# Patient Record
Sex: MALE | Race: BLACK OR AFRICAN AMERICAN | NOT HISPANIC OR LATINO | Employment: FULL TIME | URBAN - METROPOLITAN AREA
[De-identification: names, ages, dates, MRNs, and addresses within clinical notes are randomized per-mention and may not be internally consistent; named-entity substitution may affect disease eponyms.]

---

## 2024-10-21 ENCOUNTER — HOSPITAL ENCOUNTER (EMERGENCY)
Facility: HOSPITAL | Age: 39
End: 2024-10-21
Attending: EMERGENCY MEDICINE
Payer: COMMERCIAL

## 2024-10-21 VITALS
DIASTOLIC BLOOD PRESSURE: 94 MMHG | TEMPERATURE: 98 F | WEIGHT: 236 LBS | HEART RATE: 89 BPM | HEIGHT: 72 IN | BODY MASS INDEX: 31.97 KG/M2 | OXYGEN SATURATION: 100 % | SYSTOLIC BLOOD PRESSURE: 167 MMHG | RESPIRATION RATE: 16 BRPM

## 2024-10-21 DIAGNOSIS — E11.10 DIABETIC KETOACIDOSIS WITHOUT COMA ASSOCIATED WITH TYPE 2 DIABETES MELLITUS: Primary | ICD-10-CM

## 2024-10-21 DIAGNOSIS — R73.9 HYPERGLYCEMIA: ICD-10-CM

## 2024-10-21 LAB
ALBUMIN SERPL BCP-MCNC: 4.8 G/DL (ref 3.5–5.2)
ALLENS TEST: ABNORMAL
ALP SERPL-CCNC: 103 U/L (ref 38–126)
ALT SERPL W/O P-5'-P-CCNC: 21 U/L (ref 10–44)
ANION GAP SERPL CALC-SCNC: 16 MMOL/L (ref 8–16)
ANION GAP SERPL CALC-SCNC: 21 MMOL/L (ref 8–16)
AST SERPL-CCNC: 26 U/L (ref 15–46)
B-OH-BUTYR BLD STRIP-SCNC: 4.9 MMOL/L (ref 0–0.5)
BACTERIA #/AREA URNS AUTO: ABNORMAL /HPF
BASOPHILS # BLD AUTO: 0.04 K/UL (ref 0–0.2)
BASOPHILS NFR BLD: 0.4 % (ref 0–1.9)
BILIRUB SERPL-MCNC: 0.5 MG/DL (ref 0.1–1)
BILIRUB UR QL STRIP: ABNORMAL
CALCIUM SERPL-MCNC: 8.4 MG/DL (ref 8.7–10.5)
CALCIUM SERPL-MCNC: 9.8 MG/DL (ref 8.7–10.5)
CHLORIDE SERPL-SCNC: 101 MMOL/L (ref 95–110)
CHLORIDE SERPL-SCNC: 97 MMOL/L (ref 95–110)
CLARITY UR REFRACT.AUTO: CLEAR
CO2 SERPL-SCNC: 14 MMOL/L (ref 23–29)
CO2 SERPL-SCNC: 17 MMOL/L (ref 23–29)
COLOR UR AUTO: YELLOW
CREAT SERPL-MCNC: 0.85 MG/DL (ref 0.5–1.4)
CREAT SERPL-MCNC: 0.86 MG/DL (ref 0.5–1.4)
DIFFERENTIAL METHOD BLD: ABNORMAL
EOSINOPHIL # BLD AUTO: 0.1 K/UL (ref 0–0.5)
EOSINOPHIL NFR BLD: 0.6 % (ref 0–8)
ERYTHROCYTE [DISTWIDTH] IN BLOOD BY AUTOMATED COUNT: 12.4 % (ref 11.5–14.5)
EST. GFR  (NO RACE VARIABLE): >60 ML/MIN/1.73 M^2
EST. GFR  (NO RACE VARIABLE): >60 ML/MIN/1.73 M^2
GLUCOSE SERPL-MCNC: 277 MG/DL (ref 70–110)
GLUCOSE SERPL-MCNC: 418 MG/DL (ref 70–110)
GLUCOSE UR QL STRIP: ABNORMAL
HCO3 UR-SCNC: 18.8 MMOL/L (ref 24–28)
HCT VFR BLD AUTO: 38.8 % (ref 40–54)
HGB BLD-MCNC: 13.6 G/DL (ref 14–18)
HGB UR QL STRIP: ABNORMAL
IMM GRANULOCYTES # BLD AUTO: 0.02 K/UL (ref 0–0.04)
IMM GRANULOCYTES NFR BLD AUTO: 0.2 % (ref 0–0.5)
KETONES UR QL STRIP: ABNORMAL
LEUKOCYTE ESTERASE UR QL STRIP: NEGATIVE
LYMPHOCYTES # BLD AUTO: 4.6 K/UL (ref 1–4.8)
LYMPHOCYTES NFR BLD: 42.4 % (ref 18–48)
MCH RBC QN AUTO: 30.9 PG (ref 27–31)
MCHC RBC AUTO-ENTMCNC: 35.1 G/DL (ref 32–36)
MCV RBC AUTO: 88 FL (ref 82–98)
MICROSCOPIC COMMENT: ABNORMAL
MONOCYTES # BLD AUTO: 1.1 K/UL (ref 0.3–1)
MONOCYTES NFR BLD: 10 % (ref 4–15)
NEUTROPHILS # BLD AUTO: 5 K/UL (ref 1.8–7.7)
NEUTROPHILS NFR BLD: 46.4 % (ref 38–73)
NITRITE UR QL STRIP: NEGATIVE
NRBC BLD-RTO: 0 /100 WBC
PCO2 BLDA: 36.3 MMHG (ref 35–45)
PH SMN: 7.32 [PH] (ref 7.35–7.45)
PH UR STRIP: 6 [PH] (ref 5–8)
PLATELET # BLD AUTO: 298 K/UL (ref 150–450)
PMV BLD AUTO: 12 FL (ref 9.2–12.9)
PO2 BLDA: 24 MMHG (ref 40–60)
POC BE: -7 MMOL/L
POC SATURATED O2: 40 % (ref 95–100)
POC TCO2: 20 MMOL/L (ref 24–29)
POCT GLUCOSE: 288 MG/DL (ref 70–110)
POTASSIUM SERPL-SCNC: 3.7 MMOL/L (ref 3.5–5.1)
POTASSIUM SERPL-SCNC: 3.7 MMOL/L (ref 3.5–5.1)
PROT SERPL-MCNC: 8.2 G/DL (ref 6–8.4)
PROT UR QL STRIP: NEGATIVE
RBC # BLD AUTO: 4.4 M/UL (ref 4.6–6.2)
RBC #/AREA URNS AUTO: 1 /HPF (ref 0–4)
SAMPLE: ABNORMAL
SITE: ABNORMAL
SODIUM SERPL-SCNC: 132 MMOL/L (ref 136–145)
SODIUM SERPL-SCNC: 134 MMOL/L (ref 136–145)
SP GR UR STRIP: >=1.03 (ref 1–1.03)
URN SPEC COLLECT METH UR: ABNORMAL
UROBILINOGEN UR STRIP-ACNC: NEGATIVE EU/DL
UUN UR-MCNC: 12 MG/DL (ref 2–20)
UUN UR-MCNC: 13 MG/DL (ref 2–20)
WBC # BLD AUTO: 10.75 K/UL (ref 3.9–12.7)
YEAST UR QL AUTO: ABNORMAL

## 2024-10-21 PROCEDURE — 99900035 HC TECH TIME PER 15 MIN (STAT): Mod: ER

## 2024-10-21 PROCEDURE — 96361 HYDRATE IV INFUSION ADD-ON: CPT | Mod: ER

## 2024-10-21 PROCEDURE — 85025 COMPLETE CBC W/AUTO DIFF WBC: CPT | Mod: ER | Performed by: PHYSICIAN ASSISTANT

## 2024-10-21 PROCEDURE — 99285 EMERGENCY DEPT VISIT HI MDM: CPT | Mod: 25,ER

## 2024-10-21 PROCEDURE — 80048 BASIC METABOLIC PNL TOTAL CA: CPT | Mod: 91,ER,XB | Performed by: PHYSICIAN ASSISTANT

## 2024-10-21 PROCEDURE — 81000 URINALYSIS NONAUTO W/SCOPE: CPT | Mod: ER | Performed by: PHYSICIAN ASSISTANT

## 2024-10-21 PROCEDURE — 82010 KETONE BODYS QUAN: CPT | Mod: ER | Performed by: PHYSICIAN ASSISTANT

## 2024-10-21 PROCEDURE — 80053 COMPREHEN METABOLIC PANEL: CPT | Mod: ER | Performed by: PHYSICIAN ASSISTANT

## 2024-10-21 PROCEDURE — 93010 ELECTROCARDIOGRAM REPORT: CPT | Mod: ,,, | Performed by: INTERNAL MEDICINE

## 2024-10-21 PROCEDURE — 82803 BLOOD GASES ANY COMBINATION: CPT | Mod: ER

## 2024-10-21 PROCEDURE — 82962 GLUCOSE BLOOD TEST: CPT | Mod: ER

## 2024-10-21 PROCEDURE — 93005 ELECTROCARDIOGRAM TRACING: CPT | Mod: ER

## 2024-10-21 PROCEDURE — 25000003 PHARM REV CODE 250: Mod: ER | Performed by: PHYSICIAN ASSISTANT

## 2024-10-21 PROCEDURE — 63600175 PHARM REV CODE 636 W HCPCS: Mod: ER | Performed by: PHYSICIAN ASSISTANT

## 2024-10-21 PROCEDURE — 96374 THER/PROPH/DIAG INJ IV PUSH: CPT | Mod: ER

## 2024-10-21 PROCEDURE — 25000003 PHARM REV CODE 250: Mod: ER | Performed by: EMERGENCY MEDICINE

## 2024-10-21 RX ORDER — POTASSIUM CHLORIDE 20 MEQ/1
40 TABLET, EXTENDED RELEASE ORAL
Status: COMPLETED | OUTPATIENT
Start: 2024-10-21 | End: 2024-10-21

## 2024-10-21 RX ADMIN — POTASSIUM CHLORIDE 40 MEQ: 1500 TABLET, EXTENDED RELEASE ORAL at 07:10

## 2024-10-21 RX ADMIN — SODIUM CHLORIDE 1000 ML: 9 INJECTION, SOLUTION INTRAVENOUS at 07:10

## 2024-10-21 RX ADMIN — HUMAN INSULIN 10 UNITS: 100 INJECTION, SOLUTION SUBCUTANEOUS at 08:10

## 2024-10-21 RX ADMIN — SODIUM CHLORIDE 1000 ML: 9 INJECTION, SOLUTION INTRAVENOUS at 08:10

## 2024-10-22 PROBLEM — E66.09 CLASS 1 OBESITY DUE TO EXCESS CALORIES WITHOUT SERIOUS COMORBIDITY WITH BODY MASS INDEX (BMI) OF 32.0 TO 32.9 IN ADULT: Status: ACTIVE | Noted: 2024-10-22

## 2024-10-22 PROBLEM — E11.10 DKA (DIABETIC KETOACIDOSIS): Status: ACTIVE | Noted: 2024-10-22

## 2024-10-22 PROBLEM — Z87.891 FORMER SMOKER, STOPPED SMOKING IN DISTANT PAST: Status: ACTIVE | Noted: 2024-10-22

## 2024-10-22 PROBLEM — D64.9 NORMOCYTIC ANEMIA: Status: ACTIVE | Noted: 2024-10-22

## 2024-10-22 PROBLEM — E11.9 DIABETES MELLITUS, NEW ONSET: Status: ACTIVE | Noted: 2024-10-22

## 2024-10-22 PROBLEM — E83.39 HYPOPHOSPHATEMIA: Status: ACTIVE | Noted: 2024-10-22

## 2024-10-22 PROBLEM — Z78.9 DAILY CONSUMPTION OF ALCOHOL: Status: ACTIVE | Noted: 2024-10-22

## 2024-10-22 PROBLEM — E66.811 CLASS 1 OBESITY DUE TO EXCESS CALORIES WITHOUT SERIOUS COMORBIDITY WITH BODY MASS INDEX (BMI) OF 32.0 TO 32.9 IN ADULT: Status: ACTIVE | Noted: 2024-10-22

## 2024-10-22 LAB
OHS QRS DURATION: 86 MS
OHS QTC CALCULATION: 427 MS

## 2024-10-23 PROBLEM — E11.10 DKA (DIABETIC KETOACIDOSIS): Status: RESOLVED | Noted: 2024-10-22 | Resolved: 2024-10-23

## 2024-10-23 PROBLEM — E83.39 HYPOPHOSPHATEMIA: Status: RESOLVED | Noted: 2024-10-22 | Resolved: 2024-10-23

## 2024-10-29 ENCOUNTER — OFFICE VISIT (OUTPATIENT)
Dept: FAMILY MEDICINE | Facility: CLINIC | Age: 39
End: 2024-10-29
Payer: COMMERCIAL

## 2024-10-29 ENCOUNTER — TELEPHONE (OUTPATIENT)
Dept: FAMILY MEDICINE | Facility: CLINIC | Age: 39
End: 2024-10-29
Payer: COMMERCIAL

## 2024-10-29 VITALS
SYSTOLIC BLOOD PRESSURE: 120 MMHG | HEIGHT: 72 IN | DIASTOLIC BLOOD PRESSURE: 70 MMHG | HEART RATE: 80 BPM | BODY MASS INDEX: 30.32 KG/M2 | RESPIRATION RATE: 99 BRPM | WEIGHT: 223.88 LBS

## 2024-10-29 DIAGNOSIS — E66.811 CLASS 1 OBESITY DUE TO EXCESS CALORIES WITHOUT SERIOUS COMORBIDITY WITH BODY MASS INDEX (BMI) OF 32.0 TO 32.9 IN ADULT: ICD-10-CM

## 2024-10-29 DIAGNOSIS — D64.9 NORMOCYTIC ANEMIA: ICD-10-CM

## 2024-10-29 DIAGNOSIS — E11.9 DIABETES MELLITUS, NEW ONSET: Primary | ICD-10-CM

## 2024-10-29 DIAGNOSIS — E66.09 CLASS 1 OBESITY DUE TO EXCESS CALORIES WITHOUT SERIOUS COMORBIDITY WITH BODY MASS INDEX (BMI) OF 32.0 TO 32.9 IN ADULT: ICD-10-CM

## 2024-10-29 DIAGNOSIS — Z23 NEED FOR VACCINATION: ICD-10-CM

## 2024-10-29 DIAGNOSIS — E78.2 MIXED HYPERLIPIDEMIA: ICD-10-CM

## 2024-10-29 DIAGNOSIS — Z87.891 FORMER SMOKER, STOPPED SMOKING IN DISTANT PAST: ICD-10-CM

## 2024-10-29 PROCEDURE — 90677 PCV20 VACCINE IM: CPT | Mod: S$GLB,,, | Performed by: STUDENT IN AN ORGANIZED HEALTH CARE EDUCATION/TRAINING PROGRAM

## 2024-10-29 PROCEDURE — 3078F DIAST BP <80 MM HG: CPT | Mod: CPTII,S$GLB,, | Performed by: STUDENT IN AN ORGANIZED HEALTH CARE EDUCATION/TRAINING PROGRAM

## 2024-10-29 PROCEDURE — 3008F BODY MASS INDEX DOCD: CPT | Mod: CPTII,S$GLB,, | Performed by: STUDENT IN AN ORGANIZED HEALTH CARE EDUCATION/TRAINING PROGRAM

## 2024-10-29 PROCEDURE — 1160F RVW MEDS BY RX/DR IN RCRD: CPT | Mod: CPTII,S$GLB,, | Performed by: STUDENT IN AN ORGANIZED HEALTH CARE EDUCATION/TRAINING PROGRAM

## 2024-10-29 PROCEDURE — 90715 TDAP VACCINE 7 YRS/> IM: CPT | Mod: S$GLB,,, | Performed by: STUDENT IN AN ORGANIZED HEALTH CARE EDUCATION/TRAINING PROGRAM

## 2024-10-29 PROCEDURE — 3066F NEPHROPATHY DOC TX: CPT | Mod: CPTII,S$GLB,, | Performed by: STUDENT IN AN ORGANIZED HEALTH CARE EDUCATION/TRAINING PROGRAM

## 2024-10-29 PROCEDURE — 3061F NEG MICROALBUMINURIA REV: CPT | Mod: CPTII,S$GLB,, | Performed by: STUDENT IN AN ORGANIZED HEALTH CARE EDUCATION/TRAINING PROGRAM

## 2024-10-29 PROCEDURE — 90471 IMMUNIZATION ADMIN: CPT | Mod: S$GLB,,, | Performed by: STUDENT IN AN ORGANIZED HEALTH CARE EDUCATION/TRAINING PROGRAM

## 2024-10-29 PROCEDURE — 3046F HEMOGLOBIN A1C LEVEL >9.0%: CPT | Mod: CPTII,S$GLB,, | Performed by: STUDENT IN AN ORGANIZED HEALTH CARE EDUCATION/TRAINING PROGRAM

## 2024-10-29 PROCEDURE — 90472 IMMUNIZATION ADMIN EACH ADD: CPT | Mod: S$GLB,,, | Performed by: STUDENT IN AN ORGANIZED HEALTH CARE EDUCATION/TRAINING PROGRAM

## 2024-10-29 PROCEDURE — 1159F MED LIST DOCD IN RCRD: CPT | Mod: CPTII,S$GLB,, | Performed by: STUDENT IN AN ORGANIZED HEALTH CARE EDUCATION/TRAINING PROGRAM

## 2024-10-29 PROCEDURE — 3074F SYST BP LT 130 MM HG: CPT | Mod: CPTII,S$GLB,, | Performed by: STUDENT IN AN ORGANIZED HEALTH CARE EDUCATION/TRAINING PROGRAM

## 2024-10-29 PROCEDURE — 90656 IIV3 VACC NO PRSV 0.5 ML IM: CPT | Mod: S$GLB,,, | Performed by: STUDENT IN AN ORGANIZED HEALTH CARE EDUCATION/TRAINING PROGRAM

## 2024-10-29 PROCEDURE — 99999 PR PBB SHADOW E&M-EST. PATIENT-LVL V: CPT | Mod: PBBFAC,,, | Performed by: STUDENT IN AN ORGANIZED HEALTH CARE EDUCATION/TRAINING PROGRAM

## 2024-10-29 PROCEDURE — 99204 OFFICE O/P NEW MOD 45 MIN: CPT | Mod: 25,S$GLB,, | Performed by: STUDENT IN AN ORGANIZED HEALTH CARE EDUCATION/TRAINING PROGRAM

## 2024-10-29 RX ORDER — BLOOD-GLUCOSE CONTROL, NORMAL
1 EACH MISCELLANEOUS 2 TIMES DAILY
Qty: 100 EACH | Refills: 2 | Status: SHIPPED | OUTPATIENT
Start: 2024-10-29 | End: 2025-01-27

## 2024-10-29 RX ORDER — METFORMIN HYDROCHLORIDE 500 MG/1
500 TABLET, EXTENDED RELEASE ORAL
Qty: 90 TABLET | Refills: 3 | Status: SHIPPED | OUTPATIENT
Start: 2024-10-29 | End: 2025-10-29

## 2024-10-30 ENCOUNTER — PATIENT MESSAGE (OUTPATIENT)
Dept: FAMILY MEDICINE | Facility: CLINIC | Age: 39
End: 2024-10-30
Payer: COMMERCIAL

## 2024-10-30 RX ORDER — PEN NEEDLE, DIABETIC 33 GX5/32"
1 NEEDLE, DISPOSABLE MISCELLANEOUS NIGHTLY
Qty: 100 EACH | Refills: 3 | Status: SHIPPED | OUTPATIENT
Start: 2024-10-30 | End: 2025-01-28

## 2024-10-30 NOTE — TELEPHONE ENCOUNTER
Pt would like the needles that go to the lantus pen. It is not on list so I cannot pend. Please advise.

## 2024-10-31 ENCOUNTER — PATIENT MESSAGE (OUTPATIENT)
Dept: FAMILY MEDICINE | Facility: CLINIC | Age: 39
End: 2024-10-31
Payer: COMMERCIAL

## 2024-10-31 RX ORDER — ATORVASTATIN CALCIUM 20 MG/1
20 TABLET, FILM COATED ORAL DAILY
Qty: 90 TABLET | Refills: 3 | Status: SHIPPED | OUTPATIENT
Start: 2024-10-31 | End: 2025-10-31

## 2024-11-05 ENCOUNTER — PATIENT MESSAGE (OUTPATIENT)
Dept: RESEARCH | Facility: HOSPITAL | Age: 39
End: 2024-11-05
Payer: COMMERCIAL

## 2024-11-05 ENCOUNTER — OFFICE VISIT (OUTPATIENT)
Dept: FAMILY MEDICINE | Facility: CLINIC | Age: 39
End: 2024-11-05
Payer: COMMERCIAL

## 2024-11-05 DIAGNOSIS — E78.2 MIXED HYPERLIPIDEMIA: ICD-10-CM

## 2024-11-05 DIAGNOSIS — E11.9 DIABETES MELLITUS, NEW ONSET: Primary | ICD-10-CM

## 2024-11-05 PROCEDURE — 3061F NEG MICROALBUMINURIA REV: CPT | Mod: CPTII,95,, | Performed by: STUDENT IN AN ORGANIZED HEALTH CARE EDUCATION/TRAINING PROGRAM

## 2024-11-05 PROCEDURE — 1160F RVW MEDS BY RX/DR IN RCRD: CPT | Mod: CPTII,95,, | Performed by: STUDENT IN AN ORGANIZED HEALTH CARE EDUCATION/TRAINING PROGRAM

## 2024-11-05 PROCEDURE — 99214 OFFICE O/P EST MOD 30 MIN: CPT | Mod: 95,,, | Performed by: STUDENT IN AN ORGANIZED HEALTH CARE EDUCATION/TRAINING PROGRAM

## 2024-11-05 PROCEDURE — 3046F HEMOGLOBIN A1C LEVEL >9.0%: CPT | Mod: CPTII,95,, | Performed by: STUDENT IN AN ORGANIZED HEALTH CARE EDUCATION/TRAINING PROGRAM

## 2024-11-05 PROCEDURE — 1159F MED LIST DOCD IN RCRD: CPT | Mod: CPTII,95,, | Performed by: STUDENT IN AN ORGANIZED HEALTH CARE EDUCATION/TRAINING PROGRAM

## 2024-11-05 PROCEDURE — 3066F NEPHROPATHY DOC TX: CPT | Mod: CPTII,95,, | Performed by: STUDENT IN AN ORGANIZED HEALTH CARE EDUCATION/TRAINING PROGRAM

## 2024-11-12 ENCOUNTER — PATIENT MESSAGE (OUTPATIENT)
Dept: ADMINISTRATIVE | Facility: HOSPITAL | Age: 39
End: 2024-11-12
Payer: COMMERCIAL

## 2024-11-13 ENCOUNTER — OFFICE VISIT (OUTPATIENT)
Dept: OPTOMETRY | Facility: CLINIC | Age: 39
End: 2024-11-13
Payer: COMMERCIAL

## 2024-11-13 ENCOUNTER — PATIENT MESSAGE (OUTPATIENT)
Dept: RESEARCH | Facility: HOSPITAL | Age: 39
End: 2024-11-13
Payer: COMMERCIAL

## 2024-11-13 DIAGNOSIS — H52.4 PRESBYOPIA: ICD-10-CM

## 2024-11-13 DIAGNOSIS — E11.9 DIABETES MELLITUS, NEW ONSET: Primary | ICD-10-CM

## 2024-11-13 PROCEDURE — 1159F MED LIST DOCD IN RCRD: CPT | Mod: CPTII,S$GLB,, | Performed by: OPTOMETRIST

## 2024-11-13 PROCEDURE — 3066F NEPHROPATHY DOC TX: CPT | Mod: CPTII,S$GLB,, | Performed by: OPTOMETRIST

## 2024-11-13 PROCEDURE — 3046F HEMOGLOBIN A1C LEVEL >9.0%: CPT | Mod: CPTII,S$GLB,, | Performed by: OPTOMETRIST

## 2024-11-13 PROCEDURE — 99999 PR PBB SHADOW E&M-EST. PATIENT-LVL III: CPT | Mod: PBBFAC,,, | Performed by: OPTOMETRIST

## 2024-11-13 PROCEDURE — 3061F NEG MICROALBUMINURIA REV: CPT | Mod: CPTII,S$GLB,, | Performed by: OPTOMETRIST

## 2024-11-13 PROCEDURE — 92004 COMPRE OPH EXAM NEW PT 1/>: CPT | Mod: S$GLB,,, | Performed by: OPTOMETRIST

## 2024-11-13 PROCEDURE — 2023F DILAT RTA XM W/O RTNOPTHY: CPT | Mod: CPTII,S$GLB,, | Performed by: OPTOMETRIST

## 2024-11-13 NOTE — PROGRESS NOTES
HPI    CC: 39 yo M presents today to establish diabetic eye care. Pt reports   noticeable near vision changes, mainly when looking at cell phone. Pt   denies eye pain, floaters and flashes. Pt reports BS is much better   controlled now. He states levels have been 80 - 115 for the last few   weeks.     EVA: not sure    (-) Changes in vision   (-) Pain  (-) Irritation   (-) Itching   (-) Flashes  (-) Floaters  (-) Glasses wearer  (-) CL wearer  (-) Uses eye gtts    Does patient want a refraction today? If needed    (-) Eye injury  (-) Eye surgery   (-)POHx  (-)FOHx    (+)DM  Hemoglobin A1C       Date                     Value               Ref Range             Status                10/21/2024               12.7 (H)            4.0 - 5.6 %           Final                     Last edited by Sahra Villela, OD on 11/13/2024 12:49 PM.            Assessment /Plan     For exam results, see Encounter Report.    Diabetes mellitus, new onset  -     Ambulatory referral/consult to Optometry    Presbyopia      No retinopathy noted, OU. Continue proper BS control and annual diabetic eye exams. Monitor yearly.      2. Recommend use of OTC reading glasses prn for extended near work. Monitor yearly.        RTC in 1 year for annual eye exam or sooner if needed.

## 2024-11-13 NOTE — PROGRESS NOTES
Patient ID: Glenn Dwyer is a 38 y.o. male.    Chief Complaint: No chief complaint on file.    History of Present Illness    CHIEF COMPLAINT:  Glenn presents today for follow-up on diabetes management and medication adjustments.    DIABETES MANAGEMENT:  He reports his blood sugar range has been between 87 and 93 during the day, and around 98 before dinner. His lowest blood glucose level was 87 on November 2nd, which was the first time it had been in the 80s. He denies feeling weak or experiencing symptoms of low blood sugar.    MEDICATIONS:  He reports no side effects or adverse reactions from the new medication regimen.    DIET:  He has implemented dietary changes to lower cholesterol. He has incorporated whole grains (wheat bread and Cheerios), more vegetables (asparagus, broccoli, and salads), and fish (salmon and tuna) into his diet. He is actively seeking information about appropriate food choices and inquiring about the least fatty options.      ROS:  General: -fever, -chills, -fatigue, -weight gain, -weight loss  Eyes: -vision changes, -redness, -discharge  ENT: -ear pain, -nasal congestion, -sore throat  Cardiovascular: -chest pain, -palpitations, -lower extremity edema  Respiratory: -cough, -shortness of breath  Gastrointestinal: -abdominal pain, -nausea, -vomiting, -diarrhea, -constipation, -blood in stool  Genitourinary: -dysuria, -hematuria, -frequency  Musculoskeletal: -joint pain, -muscle pain  Skin: -rash, -lesion  Neurological: -headache, -dizziness, -numbness, -tingling, -weakness  Psychiatric: -anxiety, -depression, -sleep difficulty           General: No acute distress. Well-developed. Well-nourished.  Eyes: EOMI. Sclerae anicteric.  HENT: Normocephalic. Atraumatic. Nares patent. Moist oral mucosa.  Ears: Bilateral TMs clear. Bilateral EACs clear.  Cardiovascular: Regular rate. Regular rhythm. No murmurs. No rubs. No gallops. Normal S1, S2.  Respiratory: Normal respiratory effort. Clear to  auscultation bilaterally. No rales. No rhonchi. No wheezing.  Abdomen: Soft. Non-tender. Non-distended. Normoactive bowel sounds.  Musculoskeletal: No  obvious deformity.  Extremities: No lower extremity edema.  Neurological: Alert & oriented x3. No slurred speech. Normal gait.  Psychiatric: Normal mood. Normal affect. Good insight. Good judgment.  Skin: Warm. Dry. No rash.         Assessment & Plan    TYPE 2 DIABETES:  - Continued insulin at current dosage (27 units) as glucose levels have improved and are generally within target range.  - Advised reducing insulin by 3 units if glucose drops below 80 mg/dL due to dietary changes.  - Explained the increased risk of heart disease associated with type 2 diabetes and the protective effects of statins.  - Continued metformin ER at current dose.  - Continued insulin at 27 units, with instruction to decrease by 3 units if glucose drops below 80 mg/dL.  - Send a message if blood sugar drop significantly.    HYPERLIPIDEMIA:  - Initiated atorvastatin for patient with type 2 diabetes and elevated LDL cholesterol to reduce cardiovascular risk.  - Started atorvastatin for cholesterol management.    DIETARY MANAGEMENT:  - Discussed healthy food choices for managing cholesterol and blood sugar, including whole grains, vegetables, lean meats, and fish.  - Glenn to continue eating whole grain bread instead of white bread.  - Glenn to maintain consumption of vegetables like asparagus, broccoli, and salads.  - Recommend choosing lean meats like chicken and less fatty cuts of red meat.  - Glenn to include fish such as salmon and tuna in diet.    FOLLOW UP:  - Contact the office if experiencing any side effects from the new medication (atorvastatin).       1. Diabetes mellitus, new onset    2. Mixed hyperlipidemia          The patient location is: [place]  The chief complaint leading to consultation is: Hyperlipidemia + DM type 2    Visit type: audiovisual    Face to Face time with  patient: 20  30 minutes of total time spent on the encounter, which includes face to face time and non-face to face time preparing to see the patient (eg, review of tests), Obtaining and/or reviewing separately obtained history, Documenting clinical information in the electronic or other health record, Independently interpreting results (not separately reported) and communicating results to the patient/family/caregiver, or Care coordination (not separately reported).         Each patient to whom he or she provides medical services by telemedicine is:  (1) informed of the relationship between the physician and patient and the respective role of any other health care provider with respect to management of the patient; and (2) notified that he or she may decline to receive medical services by telemedicine and may withdraw from such care at any time.    Notes:      No follow-ups on file.    This note was generated with the assistance of ambient listening technology. Verbal consent was obtained by the patient and accompanying visitor(s) for the recording of patient appointment to facilitate this note. I attest to having reviewed and edited the generated note for accuracy, though some syntax or spelling errors may persist. Please contact the author of this note for any clarification.    Answers submitted by the patient for this visit:  Diabetes Questionnaire (Submitted on 11/1/2024)  Chief Complaint: Diabetes problem  Diabetes type: type 1  MedicAlert ID: No  Disease duration: 1 Weeks  blurred vision: Yes  visual change: Yes  weight loss: Yes  Symptom course: stable  confusion: No  speech difficulty: No  dizziness: No  nervous/anxious: No  headaches: No  hunger: No  mood changes: No  pallor: No  seizures: No  tremors: No  sleepiness: No  sweats: No  blackouts: No  hospitalization: No  nocturnal hypoglycemia: No  required assistance: No  required glucagon: No  CVA: No  heart disease: No  impotence: No  nephropathy:  No  peripheral neuropathy: No  PVD: No  retinopathy: No  autonomic neuropathy: No  CAD risks: dyslipidemia, family history, diabetes mellitus  Current treatments: diet, insulin injections, oral agent (monotherapy)  Treatment compliance: all of the time  Dose schedule: at bedtime  Given by: patient  Injection sites: abdominal wall  Home blood tests: 1-2 x per day  Home urines: <1 x per month  Monitoring compliance: excellent  Blood glucose trend: decreasing steadily  Weight trend: stable  Current diet: diabetic  Meal planning: carbohydrate counting  Exercise: rarely  Dietitian visit: No  Eye exam current: No  Sees podiatrist: No     Statement Selected

## 2024-12-02 ENCOUNTER — OFFICE VISIT (OUTPATIENT)
Dept: FAMILY MEDICINE | Facility: CLINIC | Age: 39
End: 2024-12-02
Payer: COMMERCIAL

## 2024-12-02 VITALS
SYSTOLIC BLOOD PRESSURE: 112 MMHG | BODY MASS INDEX: 30.68 KG/M2 | HEART RATE: 93 BPM | HEIGHT: 72 IN | DIASTOLIC BLOOD PRESSURE: 78 MMHG | WEIGHT: 226.5 LBS | OXYGEN SATURATION: 97 %

## 2024-12-02 DIAGNOSIS — E11.9 DIABETES MELLITUS, NEW ONSET: Primary | ICD-10-CM

## 2024-12-02 DIAGNOSIS — Z79.4 INSULIN DEPENDENT TYPE 2 DIABETES MELLITUS: ICD-10-CM

## 2024-12-02 DIAGNOSIS — E11.9 INSULIN DEPENDENT TYPE 2 DIABETES MELLITUS: ICD-10-CM

## 2024-12-02 DIAGNOSIS — E78.2 MIXED HYPERLIPIDEMIA: ICD-10-CM

## 2024-12-02 PROCEDURE — 3066F NEPHROPATHY DOC TX: CPT | Mod: CPTII,S$GLB,,

## 2024-12-02 PROCEDURE — 3074F SYST BP LT 130 MM HG: CPT | Mod: CPTII,S$GLB,,

## 2024-12-02 PROCEDURE — 1159F MED LIST DOCD IN RCRD: CPT | Mod: CPTII,S$GLB,,

## 2024-12-02 PROCEDURE — 99999 PR PBB SHADOW E&M-EST. PATIENT-LVL IV: CPT | Mod: PBBFAC,,,

## 2024-12-02 PROCEDURE — 3008F BODY MASS INDEX DOCD: CPT | Mod: CPTII,S$GLB,,

## 2024-12-02 PROCEDURE — 3078F DIAST BP <80 MM HG: CPT | Mod: CPTII,S$GLB,,

## 2024-12-02 PROCEDURE — 3046F HEMOGLOBIN A1C LEVEL >9.0%: CPT | Mod: CPTII,S$GLB,,

## 2024-12-02 PROCEDURE — 99214 OFFICE O/P EST MOD 30 MIN: CPT | Mod: S$GLB,,,

## 2024-12-02 PROCEDURE — 3061F NEG MICROALBUMINURIA REV: CPT | Mod: CPTII,S$GLB,,

## 2024-12-02 PROCEDURE — 1160F RVW MEDS BY RX/DR IN RCRD: CPT | Mod: CPTII,S$GLB,,

## 2024-12-02 RX ORDER — BLOOD-GLUCOSE SENSOR
1 EACH MISCELLANEOUS DAILY
Qty: 5 EACH | Refills: 5 | Status: SHIPPED | OUTPATIENT
Start: 2024-12-02 | End: 2025-12-02

## 2024-12-02 NOTE — PROGRESS NOTES
Subjective:      Glenn Dwyer is a 39 y.o. male, patient of Shea Perez MD with a PMH listed below.       History of Present Illness    CHIEF COMPLAINT:  Glenn presents today for a one-month follow-up for diabetes management.    DIABETES MANAGEMENT:  He was diagnosed with diabetes in the ER on 10/21/2024, A1C 12.5. Currently taking Metformin  mg daily and Lantus 27 units nightly. Voices GI symptoms resolved with switching to Metformin ER. Blood sugar levels have been stable, with fasting levels ranging from 74 to 120, typically around 83 to 85 in the mornings and high 90s at night. He continues to make dietary changes and voices feeling much better overall now that his sugars are controlled. Denies any current complaints.          Review of Systems   Constitutional:  Negative for chills and fever.   Eyes:  Negative for visual disturbance.   Respiratory:  Negative for cough, chest tightness, shortness of breath and wheezing.    Cardiovascular:  Negative for chest pain and palpitations.   Gastrointestinal:  Negative for abdominal pain, constipation, diarrhea, nausea and vomiting.   Genitourinary:  Negative for dysuria, frequency, hematuria and urgency.   Musculoskeletal:  Negative for arthralgias and myalgias.   Skin:  Negative for rash.   Neurological:  Negative for dizziness and headaches.          Objective:     Vitals:    12/02/24 0941   BP: 112/78   BP Location: Left arm   Patient Position: Sitting   Pulse: 93   SpO2: 97%   Weight: 102.7 kg (226 lb 8.4 oz)   Height: 6' (1.829 m)          Physical Exam  Vitals reviewed.   Constitutional:       General: He is awake. He is not in acute distress.     Appearance: He is obese. He is not ill-appearing.   HENT:      Mouth/Throat:      Mouth: Mucous membranes are moist.   Eyes:      General:         Right eye: No discharge.         Left eye: No discharge.      Pupils: Pupils are equal, round, and reactive to light.   Cardiovascular:      Rate and Rhythm:  Normal rate and regular rhythm.   Pulmonary:      Effort: Pulmonary effort is normal. No respiratory distress.      Breath sounds: Normal breath sounds.   Abdominal:      General: Bowel sounds are normal. There is no distension.      Palpations: Abdomen is soft.      Tenderness: There is no abdominal tenderness.   Musculoskeletal:         General: Normal range of motion.   Skin:     General: Skin is warm and dry.   Neurological:      Mental Status: He is alert and oriented to person, place, and time.   Psychiatric:         Mood and Affect: Mood normal.         Behavior: Behavior normal. Behavior is cooperative.             Assessment:         ICD-10-CM ICD-9-CM   1. Diabetes mellitus, new onset  E11.9 250.00   2. Insulin dependent type 2 diabetes mellitus  E11.9 250.00    Z79.4 V58.67   3. Mixed hyperlipidemia  E78.2 272.2       Plan:       Diabetes mellitus, new onset  Insulin dependent type 2 diabetes mellitus  -     blood-glucose sensor (DEXCOM G7 SENSOR) Jenny; 1 Device by Misc.(Non-Drug; Combo Route) route Daily.  Dispense: 5 each; Refill: 5  -     blood-glucose meter,continuous Misc; 1 Device by Misc.(Non-Drug; Combo Route) route Daily.  Dispense: 1 each; Refill: 0  -Assessed patient's diabetes management: glucose levels have improved significantly since starting treatment, with fasting levels now ranging from  mg/dL. This am fasting glucose 90.   - Explained target A1C level of less than 7.   - Glenn to continue current diet and lifestyle modifications.  - Continue Metformin  mg daily.  - Continue Lantus insulin 27 units at night.  - Decrease Lantus by 3 units (to 24 units) if glucose is consistently less than 80 mg/dL or if experiencing symptoms of hypoglycemia.  - Ordered Dexcom continuous glucose monitoring system (pending insurance approval). Patient voices fingers tips sore from frequency of accu checks.   - Complete hemoglobin A1C lab on 1/22/2025 as scheduled.  - Follow up with Dr. Perez  as scheduled on 1/29/2025 as scheduled.     Mixed hyperlipidemia  - Continue Fenofibrate 160 mg QD  - Continue Atorvastatin 20 mg QD.  - Complete lipid panel lab on 1/22/2025 as scheduled.  - Follow up with Dr. Perez as scheduled on 1/29/2025 as scheduled.     Declined foot exam and covid vaccine at visit today.          RTC/ED precautions discussed where applicable.   Encouraged patient/caregiver to let me know if there are any further questions/concerns.   Patient/caretaker agrees with the treatment plan.     No follow-ups on file.      TRENTON Borrego  Family Medicine  [facility]     This note was generated with the assistance of ambient listening technology. Verbal consent was obtained by the patient and accompanying visitor(s) for the recording of patient appointment to facilitate this note. I attest to having reviewed and edited the generated note for accuracy, though some syntax or spelling errors may persist. Please contact the author of this note for any clarification.

## 2024-12-19 ENCOUNTER — PATIENT MESSAGE (OUTPATIENT)
Dept: FAMILY MEDICINE | Facility: CLINIC | Age: 39
End: 2024-12-19
Payer: COMMERCIAL

## 2025-01-15 ENCOUNTER — PATIENT MESSAGE (OUTPATIENT)
Dept: FAMILY MEDICINE | Facility: CLINIC | Age: 40
End: 2025-01-15
Payer: COMMERCIAL

## 2025-01-15 DIAGNOSIS — E11.9 DIABETES MELLITUS, NEW ONSET: ICD-10-CM

## 2025-01-15 DIAGNOSIS — E11.9 INSULIN DEPENDENT TYPE 2 DIABETES MELLITUS: ICD-10-CM

## 2025-01-15 DIAGNOSIS — Z79.4 INSULIN DEPENDENT TYPE 2 DIABETES MELLITUS: ICD-10-CM

## 2025-01-15 RX ORDER — ASPIRIN 81 MG/1
81 TABLET ORAL DAILY
Qty: 30 TABLET | Refills: 2 | Status: SHIPPED | OUTPATIENT
Start: 2025-01-15 | End: 2025-04-15

## 2025-01-15 RX ORDER — FENOFIBRATE 160 MG/1
160 TABLET ORAL DAILY
Qty: 30 TABLET | Refills: 2 | Status: SHIPPED | OUTPATIENT
Start: 2025-01-15 | End: 2025-04-15

## 2025-01-15 RX ORDER — BLOOD-GLUCOSE SENSOR
1 EACH MISCELLANEOUS DAILY
Qty: 5 EACH | Refills: 5 | Status: SHIPPED | OUTPATIENT
Start: 2025-01-15 | End: 2026-01-15

## 2025-01-18 ENCOUNTER — PATIENT MESSAGE (OUTPATIENT)
Dept: FAMILY MEDICINE | Facility: CLINIC | Age: 40
End: 2025-01-18
Payer: COMMERCIAL

## 2025-01-24 NOTE — TELEPHONE ENCOUNTER
I called the pharmacy to see what was going on bc this pt has refills and Denise even sent a message in regards to it and he read it. The pharmacy told me that the medicine does have refills and they are working on it. I called pt and informed him and he understood verbally.

## 2025-01-27 ENCOUNTER — PATIENT MESSAGE (OUTPATIENT)
Dept: FAMILY MEDICINE | Facility: CLINIC | Age: 40
End: 2025-01-27
Payer: COMMERCIAL

## 2025-02-03 ENCOUNTER — OFFICE VISIT (OUTPATIENT)
Dept: FAMILY MEDICINE | Facility: CLINIC | Age: 40
End: 2025-02-03
Payer: COMMERCIAL

## 2025-02-03 VITALS
WEIGHT: 233.38 LBS | OXYGEN SATURATION: 98 % | SYSTOLIC BLOOD PRESSURE: 120 MMHG | HEART RATE: 82 BPM | BODY MASS INDEX: 31.61 KG/M2 | DIASTOLIC BLOOD PRESSURE: 80 MMHG | HEIGHT: 72 IN

## 2025-02-03 DIAGNOSIS — Z79.4 INSULIN DEPENDENT TYPE 2 DIABETES MELLITUS: ICD-10-CM

## 2025-02-03 DIAGNOSIS — E78.2 MIXED HYPERLIPIDEMIA: Primary | ICD-10-CM

## 2025-02-03 DIAGNOSIS — E11.9 INSULIN DEPENDENT TYPE 2 DIABETES MELLITUS: ICD-10-CM

## 2025-02-03 PROCEDURE — 3072F LOW RISK FOR RETINOPATHY: CPT | Mod: CPTII,S$GLB,, | Performed by: STUDENT IN AN ORGANIZED HEALTH CARE EDUCATION/TRAINING PROGRAM

## 2025-02-03 PROCEDURE — 3074F SYST BP LT 130 MM HG: CPT | Mod: CPTII,S$GLB,, | Performed by: STUDENT IN AN ORGANIZED HEALTH CARE EDUCATION/TRAINING PROGRAM

## 2025-02-03 PROCEDURE — 3079F DIAST BP 80-89 MM HG: CPT | Mod: CPTII,S$GLB,, | Performed by: STUDENT IN AN ORGANIZED HEALTH CARE EDUCATION/TRAINING PROGRAM

## 2025-02-03 PROCEDURE — 1160F RVW MEDS BY RX/DR IN RCRD: CPT | Mod: CPTII,S$GLB,, | Performed by: STUDENT IN AN ORGANIZED HEALTH CARE EDUCATION/TRAINING PROGRAM

## 2025-02-03 PROCEDURE — 3008F BODY MASS INDEX DOCD: CPT | Mod: CPTII,S$GLB,, | Performed by: STUDENT IN AN ORGANIZED HEALTH CARE EDUCATION/TRAINING PROGRAM

## 2025-02-03 PROCEDURE — 99214 OFFICE O/P EST MOD 30 MIN: CPT | Mod: S$GLB,,, | Performed by: STUDENT IN AN ORGANIZED HEALTH CARE EDUCATION/TRAINING PROGRAM

## 2025-02-03 PROCEDURE — 1159F MED LIST DOCD IN RCRD: CPT | Mod: CPTII,S$GLB,, | Performed by: STUDENT IN AN ORGANIZED HEALTH CARE EDUCATION/TRAINING PROGRAM

## 2025-02-03 PROCEDURE — 3044F HG A1C LEVEL LT 7.0%: CPT | Mod: CPTII,S$GLB,, | Performed by: STUDENT IN AN ORGANIZED HEALTH CARE EDUCATION/TRAINING PROGRAM

## 2025-02-03 PROCEDURE — 99999 PR PBB SHADOW E&M-EST. PATIENT-LVL V: CPT | Mod: PBBFAC,,, | Performed by: STUDENT IN AN ORGANIZED HEALTH CARE EDUCATION/TRAINING PROGRAM

## 2025-02-03 RX ORDER — METFORMIN HYDROCHLORIDE 500 MG/1
1000 TABLET, EXTENDED RELEASE ORAL 2 TIMES DAILY WITH MEALS
Qty: 360 TABLET | Refills: 3 | Status: SHIPPED | OUTPATIENT
Start: 2025-02-03 | End: 2026-02-03

## 2025-02-03 NOTE — PROGRESS NOTES
Patient ID: Glenn Dwyer is a 39 y.o. male.    Chief Complaint: Follow-up    History of Present Illness    CHIEF COMPLAINT:  Glenn presents today for diabetes follow up.    DIABETES:  He uses Dexcom continuous glucose monitor and reports morning finger stick blood sugar was 83. He denies symptoms of hypoglycemia. He continues insulin 27 units at night and Metformin 500mg extended release daily without side effects.    MEDICATIONS:  He continues cholesterol medication and aspirin.      ROS:  General: -fever, -chills, -fatigue, -weight gain, -weight loss  Eyes: -vision changes, -redness, -discharge  ENT: -ear pain, -nasal congestion, -sore throat  Cardiovascular: -chest pain, -palpitations, -lower extremity edema  Respiratory: -cough, -shortness of breath  Gastrointestinal: -abdominal pain, -nausea, -vomiting, -diarrhea, -constipation, -blood in stool  Genitourinary: -dysuria, -hematuria, -frequency  Musculoskeletal: -joint pain, -muscle pain  Skin: -rash, -lesion  Neurological: -headache, -dizziness, -numbness, -tingling, -weakness  Psychiatric: -anxiety, -depression, -sleep difficulty         Physical Exam    General: No acute distress. Well-developed. Well-nourished.  Eyes: EOMI. Sclerae anicteric.  HENT: Normocephalic. Atraumatic. Nares patent. Moist oral mucosa.  Ears: Bilateral TMs clear. Bilateral EACs clear.  Cardiovascular: Regular rate. Regular rhythm. No murmurs. No rubs. No gallops. Normal S1, S2.  Respiratory: Normal respiratory effort. Clear to auscultation bilaterally. No rales. No rhonchi. No wheezing.  Abdomen: Soft. Non-tender. Non-distended. Normoactive bowel sounds.  Musculoskeletal: No  obvious deformity.  Extremities: No lower extremity edema.  Neurological: Alert & oriented x3. No slurred speech. Normal gait.  Psychiatric: Normal mood. Normal affect. Good insight. Good judgment.  Skin: Warm. Dry. No rash.         Assessment & Plan    DIABETES:  - Assessed glycemic control; A1C has improved  significantly.  - Evaluated current insulin regimen; patient is using 27 units of insulin at night.  - Aimed to maintain morning glucose between  mg/dL.  - Instructed the patient to monitor morning glucose daily and log readings.  - Educated the patient on symptoms of hypoglycemia (shaky, sweaty, finger stick <80 or 70 mg/dL).  - Advised the patient to carry a quick source of sugar for hypoglycemia management.  - Explained the 15-minute discrepancy between Dexcom and finger stick readings.  - Informed the patient that finger stick is more accurate than Dexcom readings.  - Considered strategy to potentially wean patient off insulin.  - Will gradually reduce insulin dose while increasing metformin.  - Decreased insulin from 27 units to 24 units nightly.  - Instructed the patient to adjust insulin dose based on morning glucose readings: Decrease insulin by 2 units if glucose consistently below 90 mg/dL; Increase insulin by 2 units if glucose consistently above 140-150 mg/dL.  - Planned to increase metformin to 2000 mg daily over 3 weeks.  - May consider adding GLP-1 agonist or other oral agents in future.  - Ordered A1C test for 3 months.  - Referred the patient to diabetes educator for meal planning and insulin management.  - Noted patient uses a Dexcom continuous glucose monitor.  - Addressed patient's reports of occasional discrepancies between Dexcom readings and finger stick measurements.  - Continued use of Dexcom for glucose monitoring, with periodic finger stick checks for calibration.  - Noted patient reports maintaining a good diet.  - Recommend diabetes education for meal planning and insulin management.  - Referred patient to diabetes educator for dietary counseling and insulin management.  - Planned to maximize metformin dosage to 2000mg daily over 3 weeks.  - Increased metformin (extended release) gradually: Week 1: 500mg in morning, 500mg at night; Week 2: 1000mg in morning, 500mg at night; Week 3:  1000mg in morning, 1000mg at night.    HYPERLIPIDEMIA:  - Continued current cholesterol medication.  - Ordered cholesterol panel.    CARDIOVASCULAR HEALTH:  - Continued current aspirin regimen.    FOLLOW UP:  - Scheduled follow up in 3 weeks to reassess glucose control and medication regimen.  - Contact office if running out of medications or need refills.       1. Mixed hyperlipidemia    2. Insulin dependent type 2 diabetes mellitus  Assessment & Plan:  Assessment:  Type 2 diabetes with significant improvement (HbA1c from 12.7% to 6.2% in 3 months).  On Metformin  mg daily and 27 units nightly insulin.  Fasting glucose 83 mg/dL, suggesting possible over-insulinization.  Plan:  Increase Metformin XR by 500 mg weekly to 2000 mg daily as tolerated.  Reduce insulin to 24 units nightly, with further reductions of 2-4 units every 1-2 weeks based on fasting glucose.  Monitor fasting glucose daily; aim to discontinue insulin if stable.  Follow up in 3 weeks to review glucose logs; recheck HbA1c in 3 months.  Reinforce dietary changes and watch for hypoglycemia symptoms.      Protective Sensation (w/ 10 gram monofilament):  Right: Intact  Left: Intact    Visual Inspection:  Normal -  Bilateral    Pedal Pulses:   Right: Present  Left: Present    Posterior Tibialis Pulses:   Right:Present  Left: Present     Orders:  -     Hemoglobin A1C; Future; Expected date: 05/03/2025  -     Ambulatory referral/consult to Diabetes Education; Future; Expected date: 02/10/2025    Other orders  -     metFORMIN (GLUCOPHAGE-XR) 500 MG ER 24hr tablet; Take 2 tablets (1,000 mg total) by mouth 2 (two) times daily with meals. Increase by 500 mg every week until you reach goal of 2000 mg daily (2 in the morning, 2 at night).  Dispense: 360 tablet; Refill: 3             No follow-ups on file.    This note was generated with the assistance of ambient listening technology. Verbal consent was obtained by the patient and accompanying visitor(s) for  the recording of patient appointment to facilitate this note. I attest to having reviewed and edited the generated note for accuracy, though some syntax or spelling errors may persist. Please contact the author of this note for any clarification.

## 2025-02-03 NOTE — ASSESSMENT & PLAN NOTE
Assessment:  Type 2 diabetes with significant improvement (HbA1c from 12.7% to 6.2% in 3 months).  On Metformin  mg daily and 27 units nightly insulin.  Fasting glucose 83 mg/dL, suggesting possible over-insulinization.  Plan:  Increase Metformin XR by 500 mg weekly to 2000 mg daily as tolerated.  Reduce insulin to 24 units nightly, with further reductions of 2-4 units every 1-2 weeks based on fasting glucose.  Monitor fasting glucose daily; aim to discontinue insulin if stable.  Follow up in 3 weeks to review glucose logs; recheck HbA1c in 3 months.  Reinforce dietary changes and watch for hypoglycemia symptoms.      Protective Sensation (w/ 10 gram monofilament):  Right: Intact  Left: Intact    Visual Inspection:  Normal -  Bilateral    Pedal Pulses:   Right: Present  Left: Present    Posterior Tibialis Pulses:   Right:Present  Left: Present

## 2025-02-17 RX ORDER — FENOFIBRATE 160 MG/1
160 TABLET ORAL DAILY
Qty: 30 TABLET | Refills: 2 | Status: SHIPPED | OUTPATIENT
Start: 2025-02-17 | End: 2025-05-18

## 2025-02-24 ENCOUNTER — OFFICE VISIT (OUTPATIENT)
Dept: FAMILY MEDICINE | Facility: CLINIC | Age: 40
End: 2025-02-24
Payer: COMMERCIAL

## 2025-02-24 VITALS
HEIGHT: 72 IN | SYSTOLIC BLOOD PRESSURE: 120 MMHG | WEIGHT: 229.06 LBS | BODY MASS INDEX: 31.03 KG/M2 | HEART RATE: 77 BPM | OXYGEN SATURATION: 98 % | DIASTOLIC BLOOD PRESSURE: 80 MMHG

## 2025-02-24 DIAGNOSIS — E11.9 INSULIN DEPENDENT TYPE 2 DIABETES MELLITUS: Primary | ICD-10-CM

## 2025-02-24 DIAGNOSIS — Z79.4 INSULIN DEPENDENT TYPE 2 DIABETES MELLITUS: Primary | ICD-10-CM

## 2025-02-24 PROCEDURE — 3044F HG A1C LEVEL LT 7.0%: CPT | Mod: CPTII,S$GLB,, | Performed by: STUDENT IN AN ORGANIZED HEALTH CARE EDUCATION/TRAINING PROGRAM

## 2025-02-24 PROCEDURE — G2211 COMPLEX E/M VISIT ADD ON: HCPCS | Mod: S$GLB,,, | Performed by: STUDENT IN AN ORGANIZED HEALTH CARE EDUCATION/TRAINING PROGRAM

## 2025-02-24 PROCEDURE — 3074F SYST BP LT 130 MM HG: CPT | Mod: CPTII,S$GLB,, | Performed by: STUDENT IN AN ORGANIZED HEALTH CARE EDUCATION/TRAINING PROGRAM

## 2025-02-24 PROCEDURE — 3079F DIAST BP 80-89 MM HG: CPT | Mod: CPTII,S$GLB,, | Performed by: STUDENT IN AN ORGANIZED HEALTH CARE EDUCATION/TRAINING PROGRAM

## 2025-02-24 PROCEDURE — 99214 OFFICE O/P EST MOD 30 MIN: CPT | Mod: S$GLB,,, | Performed by: STUDENT IN AN ORGANIZED HEALTH CARE EDUCATION/TRAINING PROGRAM

## 2025-02-24 PROCEDURE — 99999 PR PBB SHADOW E&M-EST. PATIENT-LVL IV: CPT | Mod: PBBFAC,,, | Performed by: STUDENT IN AN ORGANIZED HEALTH CARE EDUCATION/TRAINING PROGRAM

## 2025-02-24 PROCEDURE — 3072F LOW RISK FOR RETINOPATHY: CPT | Mod: CPTII,S$GLB,, | Performed by: STUDENT IN AN ORGANIZED HEALTH CARE EDUCATION/TRAINING PROGRAM

## 2025-02-24 PROCEDURE — 1159F MED LIST DOCD IN RCRD: CPT | Mod: CPTII,S$GLB,, | Performed by: STUDENT IN AN ORGANIZED HEALTH CARE EDUCATION/TRAINING PROGRAM

## 2025-02-24 PROCEDURE — 1160F RVW MEDS BY RX/DR IN RCRD: CPT | Mod: CPTII,S$GLB,, | Performed by: STUDENT IN AN ORGANIZED HEALTH CARE EDUCATION/TRAINING PROGRAM

## 2025-02-24 PROCEDURE — 3008F BODY MASS INDEX DOCD: CPT | Mod: CPTII,S$GLB,, | Performed by: STUDENT IN AN ORGANIZED HEALTH CARE EDUCATION/TRAINING PROGRAM

## 2025-02-24 NOTE — PROGRESS NOTES
Patient ID: Glenn Dwyer is a 39 y.o. male.    Chief Complaint: Follow-up (3 wk f/u)    History of Present Illness    CHIEF COMPLAINT:  Glenn presents today for diabetes follow up.    DIABETES MANAGEMENT:  He reports blood sugar readings ranging from . He is currently taking insulin 22 units and metformin, which he tolerates without side effects. He has obtained insulin pens from pharmacy.      ROS:  General: -fever, -chills, -fatigue, -weight gain, -weight loss  Eyes: -vision changes, -redness, -discharge  ENT: -ear pain, -nasal congestion, -sore throat  Cardiovascular: -chest pain, -palpitations, -lower extremity edema  Respiratory: -cough, -shortness of breath  Gastrointestinal: -abdominal pain, -nausea, -vomiting, -diarrhea, -constipation, -blood in stool  Genitourinary: -dysuria, -hematuria, -frequency  Musculoskeletal: -joint pain, -muscle pain  Skin: -rash, -lesion  Neurological: -headache, -dizziness, -numbness, -tingling  Psychiatric: -anxiety, -depression, -sleep difficulty         Physical Exam    General: No acute distress. Well-developed. Well-nourished.  Eyes: EOMI. Sclerae anicteric.  HENT: Normocephalic. Atraumatic. Nares patent. Moist oral mucosa.  Ears: Bilateral TMs clear. Bilateral EACs clear.  Cardiovascular: Regular rate. Regular rhythm. No murmurs. No rubs. No gallops. Normal S1, S2.  Respiratory: Normal respiratory effort. Clear to auscultation bilaterally. No rales. No rhonchi. No wheezing.  Abdomen: Soft. Non-tender. Non-distended. Normoactive bowel sounds.  Musculoskeletal: No  obvious deformity.  Extremities: No lower extremity edema.  Neurological: Alert & oriented x3. No slurred speech. Normal gait.  Psychiatric: Normal mood. Normal affect. Good insight. Good judgment.  Skin: Warm. Dry. No rash.         Assessment & Plan    DIABETES:  - Monitor the patient's blood sugar, which have recently improved with the current regimen.  - Evaluate recent blood sugar readings, which include  109, 81, 97, 99, 82, and 71, indicating levels within the target range, possibly lower than 90.  - Assess the need to adjust insulin dosage based on blood sugar.  - Aim to maintain glycemic control while gradually reducing insulin dose from 22 units to 19 units, and potentially to 16 units if blood sugar remains in target range.  - Consider addition of another agent if insulin cannot be discontinued with current approach.  - Prescribe metformin to improve insulin sensitivity.  - Advise the patient to contact the office if blood sugar start rising above target range during insulin reduction.  - Monitor the patient's current insulin usage, with recent dosage of 22 units.  - Evaluate that patient's blood sugar are well-controlled on current insulin regimen.  - Assess the possibility of reducing and eventually discontinuing insulin use.  - Aim to taper patient off insulin while maintaining glycemic control.  - Decrease insulin from 22 units to 19 units.  - Instruct the patient to continue decreasing insulin by 2-3 units every 2-3 days if blood sugar remains below 90.  - Advise the patient to maintain current insulin dose if blood sugar rises above target range.  - Plan to reassess insulin use in a month and consider adding another agent to help discontinue insulin if needed.  - Monitor the patient's current use of metformin.  - Evaluate that metformin is helping to control blood sugar without side effects.  - Assess the effectiveness of metformin in managing the patient's diabetes.  - Continue metformin 2000 mg daily.  - Educate the patient that metformin increases insulin sensitivity, which helps improve blood sugar control.  - Consider adding another oral agent if needed to optimize diabetes management.    FOLLOW UP:  - Schedule follow-up visit in 1 month, either in person or virtually, based on patient preference.  - Instruct the patient to upload blood sugar readings prior to virtual appointment if chosen.  -  Schedule follow up in a month to reassess the effectiveness of the current treatment regimen.       1. Insulin dependent type 2 diabetes mellitus  Assessment & Plan:  Assessment:  Type 2 diabetes with significant improvement (HbA1c from 12.7% to 6.2% in 3 months).  On Metformin XR 2000 mg daily and 22 units nightly insulin.  Fasting glucose 83 mg/dL, suggesting possible over-insulinization.  Plan:  Continue with Metformin XR 2000 mg daily -- well-tolerated.  Reduce insulin to 19 units nightly, with further reductions of 2-4 units every 1-2 weeks based on fasting glucose.  Monitor fasting glucose daily; aim to discontinue insulin if stable.  Follow up in 4 weeks to review glucose logs; recheck HbA1c in 2 months.  Reinforce dietary changes and watch for hypoglycemia symptoms.                No follow-ups on file.    This note was generated with the assistance of ambient listening technology. Verbal consent was obtained by the patient and accompanying visitor(s) for the recording of patient appointment to facilitate this note. I attest to having reviewed and edited the generated note for accuracy, though some syntax or spelling errors may persist. Please contact the author of this note for any clarification.

## 2025-02-24 NOTE — ASSESSMENT & PLAN NOTE
Assessment:  Type 2 diabetes with significant improvement (HbA1c from 12.7% to 6.2% in 3 months).  On Metformin XR 2000 mg daily and 22 units nightly insulin.  Fasting glucose 83 mg/dL, suggesting possible over-insulinization.  Plan:  Continue with Metformin XR 2000 mg daily -- well-tolerated.  Reduce insulin to 19 units nightly, with further reductions of 2-4 units every 1-2 weeks based on fasting glucose.  Monitor fasting glucose daily; aim to discontinue insulin if stable.  Follow up in 4 weeks to review glucose logs; recheck HbA1c in 2 months.  Reinforce dietary changes and watch for hypoglycemia symptoms.

## 2025-03-06 ENCOUNTER — PATIENT MESSAGE (OUTPATIENT)
Dept: DIABETES | Facility: CLINIC | Age: 40
End: 2025-03-06
Payer: COMMERCIAL

## 2025-03-21 ENCOUNTER — PATIENT MESSAGE (OUTPATIENT)
Dept: FAMILY MEDICINE | Facility: CLINIC | Age: 40
End: 2025-03-21
Payer: COMMERCIAL

## 2025-03-24 ENCOUNTER — OFFICE VISIT (OUTPATIENT)
Dept: FAMILY MEDICINE | Facility: CLINIC | Age: 40
End: 2025-03-24
Payer: COMMERCIAL

## 2025-03-24 ENCOUNTER — PATIENT MESSAGE (OUTPATIENT)
Dept: FAMILY MEDICINE | Facility: CLINIC | Age: 40
End: 2025-03-24

## 2025-03-24 DIAGNOSIS — E11.9 INSULIN DEPENDENT TYPE 2 DIABETES MELLITUS: Primary | ICD-10-CM

## 2025-03-24 DIAGNOSIS — Z79.4 INSULIN DEPENDENT TYPE 2 DIABETES MELLITUS: Primary | ICD-10-CM

## 2025-03-24 PROCEDURE — G2211 COMPLEX E/M VISIT ADD ON: HCPCS | Mod: 95,,, | Performed by: STUDENT IN AN ORGANIZED HEALTH CARE EDUCATION/TRAINING PROGRAM

## 2025-03-24 PROCEDURE — 3072F LOW RISK FOR RETINOPATHY: CPT | Mod: CPTII,95,, | Performed by: STUDENT IN AN ORGANIZED HEALTH CARE EDUCATION/TRAINING PROGRAM

## 2025-03-24 PROCEDURE — 3044F HG A1C LEVEL LT 7.0%: CPT | Mod: CPTII,95,, | Performed by: STUDENT IN AN ORGANIZED HEALTH CARE EDUCATION/TRAINING PROGRAM

## 2025-03-24 PROCEDURE — 1159F MED LIST DOCD IN RCRD: CPT | Mod: CPTII,95,, | Performed by: STUDENT IN AN ORGANIZED HEALTH CARE EDUCATION/TRAINING PROGRAM

## 2025-03-24 PROCEDURE — 98006 SYNCH AUDIO-VIDEO EST MOD 30: CPT | Mod: 95,,, | Performed by: STUDENT IN AN ORGANIZED HEALTH CARE EDUCATION/TRAINING PROGRAM

## 2025-03-24 PROCEDURE — 1160F RVW MEDS BY RX/DR IN RCRD: CPT | Mod: CPTII,95,, | Performed by: STUDENT IN AN ORGANIZED HEALTH CARE EDUCATION/TRAINING PROGRAM

## 2025-03-25 NOTE — PROGRESS NOTES
Patient ID: Glenn Dwyer is a 39 y.o. male.    Chief Complaint: No chief complaint on file.    History of Present Illness    CHIEF COMPLAINT:  Glenn presents today for diabetes follow up    DIABETES MANAGEMENT:  He reports morning fasting blood sugar of 113 with reduced insulin dose of 3 units the previous night. He continues Metformin 2000mg and insulin therapy.      ROS:  General: -fever, -chills, -fatigue, -weight gain, -weight loss  Eyes: -vision changes, -redness, -discharge  ENT: -ear pain, -nasal congestion, -sore throat  Cardiovascular: -chest pain, -palpitations, -lower extremity edema  Respiratory: -cough, -shortness of breath  Gastrointestinal: -abdominal pain, -nausea, -vomiting, -diarrhea, -constipation, -blood in stool  Genitourinary: -dysuria, -hematuria, -frequency  Musculoskeletal: -joint pain, -muscle pain  Skin: -rash, -lesion  Neurological: -headache, -dizziness, -numbness, -tingling  Psychiatric: -anxiety, -depression, -sleep difficulty         Physical Exam    General: No acute distress. Well-developed. Well-nourished.  Eyes: EOMI. Sclerae anicteric.  HENT: Normocephalic. Atraumatic. Nares patent. Moist oral mucosa.  Ears: Bilateral TMs clear. Bilateral EACs clear.  Cardiovascular: Regular rate. Regular rhythm. No murmurs. No rubs. No gallops. Normal S1, S2.  Respiratory: Normal respiratory effort. Clear to auscultation bilaterally. No rales. No rhonchi. No wheezing.  Abdomen: Soft. Non-tender. Non-distended. Normoactive bowel sounds.  Musculoskeletal: No  obvious deformity.  Extremities: No lower extremity edema.  Neurological: Alert & oriented x3. No slurred speech. Normal gait.  Psychiatric: Normal mood. Normal affect. Good insight. Good judgment.  Skin: Warm. Dry. No rash.         Assessment & Plan    TYPE 2 DIABETES MELLITUS:  - Reviewed the patient's blood sugar recordings, noting morning pre-breakfast levels are satisfactory at 113 mg/dL.  - Evaluated that the blood sugar control is  adequate.  - Discontinued insulin therapy due to well-controlled blood sugar.  - Advised to contact the office if blood sugar consistently exceed 130 mg/dL after discontinuing insulin.  - Ordered A1C test to be repeated in 2 months to reassess glycemic control after insulin discontinuation.  - If blood sugar exceed 130 mg/dL after insulin discontinuation, will consider alternative medication options.    LONG-TERM USE OF ORAL HYPOGLYCEMIC DRUGS:  - Continued metformin 2000 mg daily.    FOLLOW-UP:  - Scheduled follow-up visit in 2 months for A1C reassessment.       1. Insulin dependent type 2 diabetes mellitus          The patient location is: [place]  The chief complaint leading to consultation is: Diabetes    Visit type: audiovisual    Face to Face time with patient: 20  30 minutes of total time spent on the encounter, which includes face to face time and non-face to face time preparing to see the patient (eg, review of tests), Obtaining and/or reviewing separately obtained history, Documenting clinical information in the electronic or other health record, Independently interpreting results (not separately reported) and communicating results to the patient/family/caregiver, or Care coordination (not separately reported).         Each patient to whom he or she provides medical services by telemedicine is:  (1) informed of the relationship between the physician and patient and the respective role of any other health care provider with respect to management of the patient; and (2) notified that he or she may decline to receive medical services by telemedicine and may withdraw from such care at any time.    Notes:      No follow-ups on file.    This note was generated with the assistance of ambient listening technology. Verbal consent was obtained by the patient and accompanying visitor(s) for the recording of patient appointment to facilitate this note. I attest to having reviewed and edited the generated note for  accuracy, though some syntax or spelling errors may persist. Please contact the author of this note for any clarification.    Answers submitted by the patient for this visit:  Diabetes Questionnaire (Submitted on 3/23/2025)  Chief Complaint: Diabetes problem  Diabetes type: type 2  MedicAlert ID: No  Disease duration: 5 Months  blurred vision: No  chest pain: No  fatigue: Yes  foot paresthesias: No  foot ulcerations: No  polydipsia: No  polyphagia: No  polyuria: No  visual change: No  weakness: No  weight loss: No  Symptom course: resolved  confusion: No  speech difficulty: No  dizziness: No  nervous/anxious: No  headaches: No  hunger: No  mood changes: No  pallor: No  seizures: No  tremors: No  sleepiness: Yes  sweats: No  blackouts: No  hospitalization: No  nocturnal hypoglycemia: No  required assistance: No  required glucagon: No  CVA: No  heart disease: No  impotence: No  nephropathy: No  peripheral neuropathy: No  PVD: No  retinopathy: No  autonomic neuropathy: No  CAD risks: no known risk factors  Current treatments: diet, insulin injections, oral agent (dual therapy)  Treatment compliance: most of the time  Dose schedule: at bedtime  Given by: patient  Injection sites: abdominal wall  Home blood tests: 1-2 x per day  Home urines: 1-2 x per month  Monitoring compliance: good  Blood glucose trend: fluctuating dramatically  Weight trend: fluctuating minimally  Current diet: diabetic  Meal planning: carbohydrate counting  Exercise: rarely  Dietitian visit: No  Eye exam current: Yes  Sees podiatrist: No

## 2025-03-31 ENCOUNTER — PATIENT MESSAGE (OUTPATIENT)
Dept: FAMILY MEDICINE | Facility: CLINIC | Age: 40
End: 2025-03-31
Payer: COMMERCIAL

## 2025-04-23 ENCOUNTER — OFFICE VISIT (OUTPATIENT)
Dept: FAMILY MEDICINE | Facility: CLINIC | Age: 40
End: 2025-04-23
Payer: COMMERCIAL

## 2025-04-23 DIAGNOSIS — E11.9 TYPE 2 DIABETES MELLITUS WITHOUT COMPLICATION, WITHOUT LONG-TERM CURRENT USE OF INSULIN: Primary | ICD-10-CM

## 2025-04-23 PROCEDURE — 3072F LOW RISK FOR RETINOPATHY: CPT | Mod: CPTII,95,, | Performed by: STUDENT IN AN ORGANIZED HEALTH CARE EDUCATION/TRAINING PROGRAM

## 2025-04-23 PROCEDURE — 1159F MED LIST DOCD IN RCRD: CPT | Mod: CPTII,95,, | Performed by: STUDENT IN AN ORGANIZED HEALTH CARE EDUCATION/TRAINING PROGRAM

## 2025-04-23 PROCEDURE — 98006 SYNCH AUDIO-VIDEO EST MOD 30: CPT | Mod: 95,,, | Performed by: STUDENT IN AN ORGANIZED HEALTH CARE EDUCATION/TRAINING PROGRAM

## 2025-04-23 PROCEDURE — G2211 COMPLEX E/M VISIT ADD ON: HCPCS | Mod: 95,,, | Performed by: STUDENT IN AN ORGANIZED HEALTH CARE EDUCATION/TRAINING PROGRAM

## 2025-04-23 PROCEDURE — 3044F HG A1C LEVEL LT 7.0%: CPT | Mod: CPTII,95,, | Performed by: STUDENT IN AN ORGANIZED HEALTH CARE EDUCATION/TRAINING PROGRAM

## 2025-04-23 PROCEDURE — 1160F RVW MEDS BY RX/DR IN RCRD: CPT | Mod: CPTII,95,, | Performed by: STUDENT IN AN ORGANIZED HEALTH CARE EDUCATION/TRAINING PROGRAM

## 2025-04-23 RX ORDER — SEMAGLUTIDE 1.34 MG/ML
1 INJECTION, SOLUTION SUBCUTANEOUS
Qty: 3 ML | Refills: 11 | Status: SHIPPED | OUTPATIENT
Start: 2025-06-23 | End: 2026-06-23

## 2025-04-23 RX ORDER — SEMAGLUTIDE 0.68 MG/ML
0.25 INJECTION, SOLUTION SUBCUTANEOUS
Qty: 1.5 ML | Refills: 0 | Status: SHIPPED | OUTPATIENT
Start: 2025-04-23 | End: 2025-05-23

## 2025-04-23 RX ORDER — SEMAGLUTIDE 0.68 MG/ML
0.5 INJECTION, SOLUTION SUBCUTANEOUS
Qty: 3 ML | Refills: 0 | Status: SHIPPED | OUTPATIENT
Start: 2025-05-23 | End: 2025-06-22

## 2025-04-23 NOTE — PROGRESS NOTES
Patient ID: Glenn Dwyer is a 39 y.o. male.    Chief Complaint: No chief complaint on file.    History of Present Illness    CHIEF COMPLAINT:  Glenn presents today for diabetes follow up.    DIABETES:  He is currently managed with Metformin 2000mg twice daily and is no longer on insulin. He reports glucose levels tend to run higher at night, with a recent episode reaching 200 after consuming a salad with dressing. He uses Dexcom for glucose monitoring.    CURRENT MEDICATIONS:  He continues atorvastatin, fenofibrate, aspirin, and metformin.      ROS:  General: -fever, -chills, -fatigue, -weight gain, -weight loss  Eyes: -vision changes, -redness, -discharge  ENT: -ear pain, -nasal congestion, -sore throat  Cardiovascular: -chest pain, -palpitations, -lower extremity edema  Respiratory: -cough, -shortness of breath  Gastrointestinal: -abdominal pain, -nausea, -vomiting, -diarrhea, -constipation, -blood in stool, +loss of appetite, +appetite changes  Genitourinary: -dysuria, -hematuria, -frequency  Musculoskeletal: -joint pain, -muscle pain  Skin: -rash, -lesion  Neurological: -headache, -dizziness, -numbness, -tingling  Psychiatric: -anxiety, -depression, -sleep difficulty  Endocrine: +lightheaded if meals are missed, +irritable if meals are missed                 Assessment & Plan    TYPE 2 DIABETES MELLITUS WITH HYPERGLYCEMIA:  - Assessed glucose control after discontinuing insulin.  - Noted concern about higher glucose levels, particularly at night.  - Monitored the patient's blood sugar using a Dexcom continuous glucose monitor.  - Observed variable blood sugar, with some high readings, especially at night.  - Noted a specific instance of high blood sugar (200) after the patient consumed a salad.  - Started Ozempic (semaglutide) injectable: Initial dose 0.25 mg once weekly for 1 month, increase to 0.5 mg once weekly after 1 month, increase to 1 mg once weekly after 2 months.  - Explained once-weekly administration  and gradual dose increase plan.  - Continued Metformin 1000 mg twice daily (2000 mg total daily dose).  - Ordered HbA1c test in 1 month to reassess treatment efficacy.  - Instructed the patient to continue monitoring blood sugar and report any significant changes.  - Maintained metformin therapy at 1000 mg twice daily (2000 mg total daily dose) for diabetes management.  - Educated on potential impact of salad dressings on glucose levels.  - Recommend being mindful of restaurant salad dressings as they may contain high amounts of sugar.  - Recommend making homemade dressings using lemon and olive oil as a healthier alternative.    HYPERLIPIDEMIA:  - Continued atorvastatin and fenofibrate for hyperlipidemia management.    PRESENCE OF FUNCTIONAL IMPLANT (CONTINUOUS GLUCOSE MONITOR):  - Noted the presence of a Dexcom continuous glucose monitor implant for monitoring blood sugar.  - Instructed the patient on proper care and maintenance of the implant.  - Advised to report any issues or malfunctions with the device immediately.       1. Type 2 diabetes mellitus without complication, without long-term current use of insulin  -     semaglutide (OZEMPIC) 0.25 mg or 0.5 mg (2 mg/3 mL) pen injector; Inject 0.25 mg into the skin every 7 days.  Dispense: 1.5 mL; Refill: 0  -     semaglutide (OZEMPIC) 0.25 mg or 0.5 mg (2 mg/3 mL) pen injector; Inject 0.5 mg into the skin every 7 days.  Dispense: 3 mL; Refill: 0  -     semaglutide (OZEMPIC) 1 mg/dose (4 mg/3 mL); Inject 1 mg into the skin every 7 days.  Dispense: 3 mL; Refill: 11          The patient location is: [facility]  The chief complaint leading to consultation is: DM    Visit type: audiovisual    Face to Face time with patient: 20  30 minutes of total time spent on the encounter, which includes face to face time and non-face to face time preparing to see the patient (eg, review of tests), Obtaining and/or reviewing separately obtained history, Documenting clinical  information in the electronic or other health record, Independently interpreting results (not separately reported) and communicating results to the patient/family/caregiver, or Care coordination (not separately reported).         Each patient to whom he or she provides medical services by telemedicine is:  (1) informed of the relationship between the physician and patient and the respective role of any other health care provider with respect to management of the patient; and (2) notified that he or she may decline to receive medical services by telemedicine and may withdraw from such care at any time.    Notes:      No follow-ups on file.    This note was generated with the assistance of ambient listening technology. Verbal consent was obtained by the patient and accompanying visitor(s) for the recording of patient appointment to facilitate this note. I attest to having reviewed and edited the generated note for accuracy, though some syntax or spelling errors may persist. Please contact the author of this note for any clarification.    Answers submitted by the patient for this visit:  Diabetes Questionnaire (Submitted on 4/23/2025)  Chief Complaint: Diabetes problem  Diabetes type: type 2  MedicAlert ID: No  Disease duration: 6 Months  blurred vision: No  chest pain: No  fatigue: No  foot paresthesias: No  foot ulcerations: No  polydipsia: No  polyphagia: No  polyuria: No  visual change: No  weakness: No  weight loss: No  Symptom course: resolved  confusion: No  speech difficulty: No  dizziness: No  nervous/anxious: No  headaches: No  hunger: No  mood changes: No  pallor: No  seizures: No  tremors: No  sleepiness: No  sweats: No  hospitalization: No  nocturnal hypoglycemia: No  required assistance: No  required glucagon: No  CVA: No  heart disease: No  impotence: No  nephropathy: No  peripheral neuropathy: No  PVD: No  retinopathy: No  autonomic neuropathy: No  CAD risks: dyslipidemia, family history, obesity,  diabetes mellitus  Current treatments: diet, oral agent (monotherapy)  Treatment compliance: most of the time  Home blood tests: 1-2 x per week  Home urines: <1 x per month  Monitoring compliance: excellent  Blood glucose trend: increasing steadily  Weight trend: fluctuating minimally  Current diet: generally unhealthy  Meal planning: avoidance of concentrated sweets, carbohydrate counting  Exercise: never  Dietitian visit: No  Eye exam current: Yes  Sees podiatrist: No

## 2025-05-05 RX ORDER — ASPIRIN 81 MG/1
81 TABLET ORAL DAILY
Qty: 30 TABLET | Refills: 2 | Status: SHIPPED | OUTPATIENT
Start: 2025-05-05 | End: 2025-08-03

## 2025-06-25 RX ORDER — FENOFIBRATE 160 MG/1
160 TABLET ORAL DAILY
Qty: 30 TABLET | Refills: 2 | Status: SHIPPED | OUTPATIENT
Start: 2025-06-25 | End: 2025-09-23

## 2025-08-21 DIAGNOSIS — Z79.4 INSULIN DEPENDENT TYPE 2 DIABETES MELLITUS: ICD-10-CM

## 2025-08-21 DIAGNOSIS — E11.9 DIABETES MELLITUS, NEW ONSET: ICD-10-CM

## 2025-08-21 DIAGNOSIS — E11.9 INSULIN DEPENDENT TYPE 2 DIABETES MELLITUS: ICD-10-CM

## 2025-08-22 RX ORDER — BLOOD-GLUCOSE SENSOR
1 EACH MISCELLANEOUS DAILY
Qty: 5 EACH | Refills: 5 | Status: SHIPPED | OUTPATIENT
Start: 2025-08-22 | End: 2026-08-22

## 2025-08-24 ENCOUNTER — PATIENT MESSAGE (OUTPATIENT)
Dept: FAMILY MEDICINE | Facility: CLINIC | Age: 40
End: 2025-08-24
Payer: COMMERCIAL

## 2025-09-04 ENCOUNTER — PATIENT MESSAGE (OUTPATIENT)
Dept: FAMILY MEDICINE | Facility: CLINIC | Age: 40
End: 2025-09-04
Payer: COMMERCIAL